# Patient Record
Sex: MALE | Race: WHITE | NOT HISPANIC OR LATINO | Employment: OTHER | URBAN - METROPOLITAN AREA
[De-identification: names, ages, dates, MRNs, and addresses within clinical notes are randomized per-mention and may not be internally consistent; named-entity substitution may affect disease eponyms.]

---

## 2022-05-11 ENCOUNTER — HOSPITAL ENCOUNTER (EMERGENCY)
Facility: HOSPITAL | Age: 27
Discharge: HOME | End: 2022-05-11
Attending: EMERGENCY MEDICINE | Admitting: EMERGENCY MEDICINE

## 2022-05-11 ENCOUNTER — APPOINTMENT (OUTPATIENT)
Dept: ULTRASOUND IMAGING | Facility: HOSPITAL | Age: 27
End: 2022-05-11

## 2022-05-11 VITALS
HEIGHT: 68 IN | BODY MASS INDEX: 31.47 KG/M2 | DIASTOLIC BLOOD PRESSURE: 69 MMHG | SYSTOLIC BLOOD PRESSURE: 138 MMHG | HEART RATE: 78 BPM | OXYGEN SATURATION: 100 % | WEIGHT: 207.67 LBS | TEMPERATURE: 98.2 F | RESPIRATION RATE: 16 BRPM

## 2022-05-11 DIAGNOSIS — N50.811 RIGHT TESTICULAR PAIN: ICD-10-CM

## 2022-05-11 DIAGNOSIS — N43.3 HYDROCELE, RIGHT: Primary | ICD-10-CM

## 2022-05-11 LAB
BILIRUB UR QL STRIP: NEGATIVE
C TRACH RRNA CVX QL NAA+PROBE: NOT DETECTED
CLARITY UR: CLEAR
COLOR UR: YELLOW
GLUCOSE UR STRIP-MCNC: NEGATIVE MG/DL
HGB UR QL STRIP.AUTO: NEGATIVE
KETONES UR QL STRIP: NEGATIVE
LEUKOCYTE ESTERASE UR QL STRIP.AUTO: NEGATIVE
N GONORRHOEA RRNA SPEC QL NAA+PROBE: NOT DETECTED
NITRITE UR QL STRIP: NEGATIVE
PH UR STRIP.AUTO: 6.5 [PH] (ref 5–8)
PROT UR QL STRIP: NEGATIVE
SP GR UR STRIP: 1.01 (ref 1–1.03)
UROBILINOGEN UR QL STRIP: NORMAL

## 2022-05-11 PROCEDURE — 99283 EMERGENCY DEPT VISIT LOW MDM: CPT

## 2022-05-11 PROCEDURE — 81003 URINALYSIS AUTO W/O SCOPE: CPT | Performed by: NURSE PRACTITIONER

## 2022-05-11 PROCEDURE — 87491 CHLMYD TRACH DNA AMP PROBE: CPT | Performed by: NURSE PRACTITIONER

## 2022-05-11 PROCEDURE — 87591 N.GONORRHOEAE DNA AMP PROB: CPT | Performed by: NURSE PRACTITIONER

## 2022-05-11 PROCEDURE — 93976 VASCULAR STUDY: CPT

## 2022-05-11 RX ORDER — DICLOFENAC SODIUM 75 MG/1
75 TABLET, DELAYED RELEASE ORAL 2 TIMES DAILY PRN
Qty: 20 TABLET | Refills: 0 | Status: SHIPPED | OUTPATIENT
Start: 2022-05-11

## 2022-05-11 NOTE — ED PROVIDER NOTES
Subjective   Patient complaining of right testicular pain.  Patient states pain has been going on for approximately 4 days.  Patient states pain started out with all and that has been intermittently getting worse.  Patient states that it feels as if the pain is located posterior aspect of his right testicle.  Patient denies any injury or concerns for possible STIs.      History provided by:  Patient   used: No    Testicle Pain  Severity:  Moderate  Onset quality:  Gradual  Duration: About 4 days ago.  Timing:  Constant  Progression:  Waxing and waning  Chronicity:  New  Context:  Patient planing of posterior right testicle pain x4 days.  Relieved by:  Nothing  Worsened by:  Nothing  Associated symptoms: no abdominal pain, no chest pain, no congestion, no cough, no diarrhea, no ear pain, no fatigue, no fever, no headaches, no loss of consciousness, no myalgias, no nausea, no rash, no rhinorrhea, no shortness of breath, no sore throat, no vomiting and no wheezing        Review of Systems   Constitutional: Negative for fatigue and fever.   HENT: Negative for congestion, ear pain, rhinorrhea and sore throat.    Respiratory: Negative for cough, shortness of breath and wheezing.    Cardiovascular: Negative for chest pain.   Gastrointestinal: Negative for abdominal pain, diarrhea, nausea and vomiting.   Genitourinary: Positive for testicular pain.        Patient planing of right testicular pain for 4 days.  Patient states pain started out dull and then gradually gotten worse.  Patient states its constant right posterior testicular pain.  Patient denies any injury or any additional symptoms and or concerns at this time.   Musculoskeletal: Negative for myalgias.   Skin: Negative for rash.   Neurological: Negative for loss of consciousness and headaches.   Psychiatric/Behavioral: Negative.        History reviewed. No pertinent past medical history.    No Known Allergies    History reviewed. No pertinent  surgical history.    History reviewed. No pertinent family history.    Social History     Socioeconomic History   • Marital status:    Tobacco Use   • Smoking status: Never Smoker   • Smokeless tobacco: Never Used   Vaping Use   • Vaping Use: Every day   Substance and Sexual Activity   • Alcohol use: Yes     Comment: Occasional   • Drug use: Never           Objective   Physical Exam  Vitals and nursing note reviewed.   Constitutional:       General: He is not in acute distress.     Appearance: Normal appearance. He is not ill-appearing, toxic-appearing or diaphoretic.   HENT:      Head: Normocephalic and atraumatic.      Mouth/Throat:      Mouth: Mucous membranes are moist.   Eyes:      General: No scleral icterus.  Cardiovascular:      Rate and Rhythm: Normal rate and regular rhythm.      Pulses: Normal pulses.      Heart sounds: Normal heart sounds.   Pulmonary:      Effort: Pulmonary effort is normal. No respiratory distress.      Breath sounds: Normal breath sounds.   Abdominal:      General: Abdomen is flat.      Palpations: Abdomen is soft.      Tenderness: There is no abdominal tenderness.   Genitourinary:     Pubic Area: No rash.       Penis: Normal and circumcised.       Testes: Cremasteric reflex is present.      Vlad stage (genital): 5.       Musculoskeletal:         General: Normal range of motion.      Cervical back: Normal range of motion and neck supple.   Skin:     General: Skin is warm and dry.   Neurological:      General: No focal deficit present.      Mental Status: He is alert and oriented to person, place, and time. Mental status is at baseline.   Psychiatric:         Mood and Affect: Mood normal.         Behavior: Behavior normal.         Thought Content: Thought content normal.         Judgment: Judgment normal.         Procedures           ED Course  ED Course as of 05/11/22 1628   Wed May 11, 2022   1524 Pt seen and evaluated in triage, orders placed.  [CM]      ED Course User  Index  [CM] Mehul Rehman, APRN                                                 MDM  Number of Diagnoses or Management Options  Diagnosis management comments: I have spoken with patient. I have explained the patient´s condition, diagnoses and treatment plan based on the information available to me at this time. I have answered the patient's questions and addressed any concerns. The patient has a good  understanding of the patient´s diagnosis, condition, and treatment plan as can be expected at this point. The vital signs have been stable. The patient´s condition is stable and appropriate for discharge from the emergency department.      The patient will pursue further outpatient evaluation with the primary care physician or other designated or consulting physician as outlined in the discharge instructions. They are agreeable to this plan of care and follow-up instructions have been explained in detail. The patient has received these instructions in written format and have expressed an understanding of the discharge instructions. The patient is aware that any significant change in condition or worsening of symptoms should prompt an immediate return to this or the closest emergency department or call to 911.       Amount and/or Complexity of Data Reviewed  Clinical lab tests: reviewed and ordered  Tests in the radiology section of CPT®: reviewed    Risk of Complications, Morbidity, and/or Mortality  Presenting problems: moderate  Diagnostic procedures: low  Management options: low    Patient Progress  Patient progress: stable      Final diagnoses:   Hydrocele, right   Right testicular pain       ED Disposition  ED Disposition     ED Disposition   Discharge    Condition   Stable    Comment   --             Toni Padilla MD  [address] [place] [address]  [phone]    In 3 days      Asha Alfaro MD  [address] [place] [address]  [phone]    Schedule an appointment as soon as  possible for a visit            Medication List      New Prescriptions    diclofenac 75 MG EC tablet  Commonly known as: VOLTAREN  Take 1 tablet by mouth 2 (Two) Times a Day As Needed (Pain).           Where to Get Your Medications      These medications were sent to [facility] - [address] - [phone]  - [phone] FX  [address] [place] [address]    Phone: [phone]   · diclofenac 75 MG EC tablet          Gopal Moseley, ALEXANDREA  05/11/22 2604

## 2022-07-25 ENCOUNTER — OFFICE VISIT (OUTPATIENT)
Dept: UROLOGY | Facility: CLINIC | Age: 27
End: 2022-07-25

## 2022-07-25 VITALS — WEIGHT: 200.4 LBS | BODY MASS INDEX: 30.37 KG/M2 | HEIGHT: 68 IN

## 2022-07-25 DIAGNOSIS — N43.0 ENCYSTED HYDROCELE: Primary | ICD-10-CM

## 2022-07-25 DIAGNOSIS — F52.4 PREMATURE EJACULATION: ICD-10-CM

## 2022-07-25 PROCEDURE — 99203 OFFICE O/P NEW LOW 30 MIN: CPT | Performed by: UROLOGY

## 2022-07-25 NOTE — PROGRESS NOTES
"Chief Complaint  Establish Care (Testicular pain)    Subjective          Alonso Cowan presents to [facility]  Patient was seen in the ER for right testicular pain.  Ultrasound at that time was normal other than a trace right hydrocele.  Left testicle was normal.  He was placed on antibiotics for epididymitis.  His symptoms have somewhat improved since then.  That was approximately 6 weeks ago.  He states he is feeling much better at this point.  Still has trace pain occasionally but it comes and goes and is not incredibly painful.    He also complains of problems sustaining an erection.  He states he previously had around 18-19 years old was able to sustain an erection for 30 minutes and now only can for 15-20.  He is concerned about this and whether it is normal.  He has no problems keeping an erection and has no problems with orgasm.  He does have a new child within the last year.      Objective   Vital Signs:   Ht 172.7 cm (68\")   Wt 90.9 kg (200 lb 6.4 oz)   BMI 30.47 kg/m²       Physical Exam  Vitals and nursing note reviewed.   Constitutional:       Appearance: Normal appearance. He is well-developed.   Pulmonary:      Effort: Pulmonary effort is normal.      Breath sounds: Normal air entry.   Neurological:      Mental Status: He is alert and oriented to person, place, and time.      Motor: Motor function is intact.   Psychiatric:         Mood and Affect: Mood normal.         Behavior: Behavior normal.          Result Review :   The following data was reviewed by: Asha Alfaro MD on 07/25/2022:    Results for orders placed or performed during the hospital encounter of 05/11/22   Chlamydia trachomatis, Neisseria gonorrhoeae, PCR - Urine, Urine, Random Void    Specimen: Urine, Random Void   Result Value Ref Range    Chlamydia DNA by PCR Not Detected Not Detected     Neisseria gonorrhoeae by PCR Not Detected Not Detected    Urinalysis With Microscopic If Indicated (No Culture) - " Urine, Clean Catch    Specimen: Urine, Clean Catch   Result Value Ref Range    Color, UA Yellow Yellow, Straw    Appearance, UA Clear Clear    pH, UA 6.5 5.0 - 8.0    Specific Gravity, UA 1.013 1.005 - 1.030    Glucose, UA Negative Negative    Ketones, UA Negative Negative    Bilirubin, UA Negative Negative    Blood, UA Negative Negative    Protein, UA Negative Negative    Leuk Esterase, UA Negative Negative    Nitrite, UA Negative Negative    Urobilinogen, UA 0.2 E.U./dL 0.2 - 1.0 E.U./dL       Data reviewed: Radiologic studies scrotal ultrasound and ED notes          Assessment and Plan    Diagnoses and all orders for this visit:    1. Encysted hydrocele (Primary)  Assessment & Plan:  His hydrocele is small.  Likely related to previous infection.  Its not causing him any pain at this time.  He will call back and let me know if is bothering him.      2. Premature ejaculation  Assessment & Plan:  I reassured him that a latency time to orgasm and ejaculation for 20 minutes is perfectly normal.  He will follow back up as needed.            Follow Up       No follow-ups on file.  Patient was given instructions and counseling regarding his condition or for health maintenance advice. Please see specific information pulled into the AVS if appropriate.

## 2022-07-25 NOTE — ASSESSMENT & PLAN NOTE
His hydrocele is small.  Likely related to previous infection.  Its not causing him any pain at this time.  He will call back and let me know if is bothering him.

## 2022-07-25 NOTE — ASSESSMENT & PLAN NOTE
I reassured him that a latency time to orgasm and ejaculation for 20 minutes is perfectly normal.  He will follow back up as needed.